# Patient Record
Sex: FEMALE | Race: BLACK OR AFRICAN AMERICAN | NOT HISPANIC OR LATINO | URBAN - METROPOLITAN AREA
[De-identification: names, ages, dates, MRNs, and addresses within clinical notes are randomized per-mention and may not be internally consistent; named-entity substitution may affect disease eponyms.]

---

## 2017-02-06 ENCOUNTER — HISTORICAL (OUTPATIENT)
Dept: BARIATRICS | Facility: HOSPITAL | Age: 52
End: 2017-02-06

## 2017-03-14 ENCOUNTER — HISTORICAL (OUTPATIENT)
Dept: BARIATRICS | Facility: HOSPITAL | Age: 52
End: 2017-03-14

## 2017-03-24 ENCOUNTER — HISTORICAL (OUTPATIENT)
Dept: LAB | Facility: HOSPITAL | Age: 52
End: 2017-03-24

## 2017-03-24 ENCOUNTER — HISTORICAL (OUTPATIENT)
Dept: BARIATRICS | Facility: HOSPITAL | Age: 52
End: 2017-03-24

## 2017-04-18 ENCOUNTER — HISTORICAL (OUTPATIENT)
Dept: BARIATRICS | Facility: HOSPITAL | Age: 52
End: 2017-04-18

## 2017-05-25 ENCOUNTER — HISTORICAL (OUTPATIENT)
Dept: ADMINISTRATIVE | Facility: HOSPITAL | Age: 52
End: 2017-05-25

## 2017-05-25 LAB
ALBUMIN SERPL-MCNC: 3.7 GM/DL (ref 3.4–5)
ALBUMIN/GLOB SERPL: 1 {RATIO}
ALP SERPL-CCNC: 45 UNIT/L (ref 38–126)
ALT SERPL-CCNC: 23 UNIT/L (ref 12–78)
AST SERPL-CCNC: 19 UNIT/L (ref 15–37)
BILIRUB SERPL-MCNC: 0.4 MG/DL (ref 0.2–1)
BILIRUBIN DIRECT+TOT PNL SERPL-MCNC: 0.1 MG/DL (ref 0–0.2)
BILIRUBIN DIRECT+TOT PNL SERPL-MCNC: 0.3 MG/DL (ref 0–0.8)
BUN SERPL-MCNC: 13 MG/DL (ref 7–18)
CALCIUM SERPL-MCNC: 9.5 MG/DL (ref 8.5–10.1)
CHLORIDE SERPL-SCNC: 104 MMOL/L (ref 98–107)
CO2 SERPL-SCNC: 25 MMOL/L (ref 21–32)
CREAT SERPL-MCNC: 0.64 MG/DL (ref 0.55–1.02)
ERYTHROCYTE [DISTWIDTH] IN BLOOD BY AUTOMATED COUNT: 17.2 % (ref 11.5–17)
GLOBULIN SER-MCNC: 3.6 GM/DL (ref 2.4–3.5)
GLUCOSE SERPL-MCNC: 78 MG/DL (ref 74–106)
HCT VFR BLD AUTO: 35.8 % (ref 37–47)
HGB BLD-MCNC: 10.7 GM/DL (ref 12–16)
IRON SATN MFR SERPL: 15.4 % (ref 20–50)
IRON SERPL-MCNC: 45 MCG/DL (ref 50–175)
MCH RBC QN AUTO: 25.4 PG (ref 27–31)
MCHC RBC AUTO-ENTMCNC: 29.9 GM/DL (ref 33–36)
MCV RBC AUTO: 85 FL (ref 80–94)
PLATELET # BLD AUTO: 342 X10(3)/MCL (ref 130–400)
PMV BLD AUTO: 11.3 FL (ref 9.4–12.4)
POTASSIUM SERPL-SCNC: 4.3 MMOL/L (ref 3.5–5.1)
PROT SERPL-MCNC: 7.3 GM/DL (ref 6.4–8.2)
RBC # BLD AUTO: 4.21 X10(6)/MCL (ref 4.2–5.4)
SODIUM SERPL-SCNC: 141 MMOL/L (ref 136–145)
TIBC SERPL-MCNC: 292 MCG/DL (ref 250–450)
TRANSFERRIN SERPL-MCNC: 259 MG/DL (ref 200–360)
VIT B12 SERPL-MCNC: 1007 PG/ML (ref 193–986)
WBC # SPEC AUTO: 6 X10(3)/MCL (ref 4.5–11.5)

## 2017-06-26 ENCOUNTER — HISTORICAL (OUTPATIENT)
Dept: BARIATRICS | Facility: HOSPITAL | Age: 52
End: 2017-06-26

## 2017-09-27 ENCOUNTER — HISTORICAL (OUTPATIENT)
Dept: ADMINISTRATIVE | Facility: HOSPITAL | Age: 52
End: 2017-09-27

## 2017-09-27 LAB
ALBUMIN SERPL-MCNC: 3.7 GM/DL (ref 3.4–5)
ALBUMIN/GLOB SERPL: 1 {RATIO}
ALP SERPL-CCNC: 50 UNIT/L (ref 38–126)
ALT SERPL-CCNC: 16 UNIT/L (ref 12–78)
AST SERPL-CCNC: 11 UNIT/L (ref 15–37)
BILIRUB SERPL-MCNC: 0.3 MG/DL (ref 0.2–1)
BILIRUBIN DIRECT+TOT PNL SERPL-MCNC: 0.1 MG/DL (ref 0–0.2)
BILIRUBIN DIRECT+TOT PNL SERPL-MCNC: 0.2 MG/DL (ref 0–0.8)
BUN SERPL-MCNC: 11 MG/DL (ref 7–18)
CALCIUM SERPL-MCNC: 9.4 MG/DL (ref 8.5–10.1)
CHLORIDE SERPL-SCNC: 105 MMOL/L (ref 98–107)
CO2 SERPL-SCNC: 27 MMOL/L (ref 21–32)
CREAT SERPL-MCNC: 0.55 MG/DL (ref 0.55–1.02)
ERYTHROCYTE [DISTWIDTH] IN BLOOD BY AUTOMATED COUNT: 14.8 % (ref 11.5–17)
GLOBULIN SER-MCNC: 3.6 GM/DL (ref 2.4–3.5)
GLUCOSE SERPL-MCNC: 83 MG/DL (ref 74–106)
HCT VFR BLD AUTO: 35.7 % (ref 37–47)
HGB BLD-MCNC: 11.1 GM/DL (ref 12–16)
IRON SATN MFR SERPL: 17.4 % (ref 20–50)
IRON SERPL-MCNC: 52 MCG/DL (ref 50–175)
MCH RBC QN AUTO: 26.7 PG (ref 27–31)
MCHC RBC AUTO-ENTMCNC: 31.1 GM/DL (ref 33–36)
MCV RBC AUTO: 86 FL (ref 80–94)
PLATELET # BLD AUTO: 294 X10(3)/MCL (ref 130–400)
PMV BLD AUTO: 10.6 FL (ref 9.4–12.4)
POTASSIUM SERPL-SCNC: 4.3 MMOL/L (ref 3.5–5.1)
PROT SERPL-MCNC: 7.3 GM/DL (ref 6.4–8.2)
RBC # BLD AUTO: 4.15 X10(6)/MCL (ref 4.2–5.4)
SODIUM SERPL-SCNC: 139 MMOL/L (ref 136–145)
TIBC SERPL-MCNC: 299 MCG/DL (ref 250–450)
TRANSFERRIN SERPL-MCNC: 244 MG/DL (ref 200–360)
VIT B12 SERPL-MCNC: 755 PG/ML (ref 193–986)
WBC # SPEC AUTO: 5.8 X10(3)/MCL (ref 4.5–11.5)

## 2017-10-06 ENCOUNTER — HISTORICAL (OUTPATIENT)
Dept: BARIATRICS | Facility: HOSPITAL | Age: 52
End: 2017-10-06

## 2017-10-11 ENCOUNTER — HISTORICAL (OUTPATIENT)
Dept: RADIOLOGY | Facility: HOSPITAL | Age: 52
End: 2017-10-11

## 2017-10-12 ENCOUNTER — HISTORICAL (OUTPATIENT)
Dept: RADIOLOGY | Facility: HOSPITAL | Age: 52
End: 2017-10-12

## 2017-10-19 ENCOUNTER — HISTORICAL (OUTPATIENT)
Dept: RADIOLOGY | Facility: HOSPITAL | Age: 52
End: 2017-10-19

## 2022-04-09 ENCOUNTER — HISTORICAL (OUTPATIENT)
Dept: ADMINISTRATIVE | Facility: HOSPITAL | Age: 57
End: 2022-04-09

## 2022-04-27 VITALS
SYSTOLIC BLOOD PRESSURE: 132 MMHG | DIASTOLIC BLOOD PRESSURE: 90 MMHG | WEIGHT: 180 LBS | HEIGHT: 65 IN | BODY MASS INDEX: 29.99 KG/M2

## 2024-03-07 ENCOUNTER — OFFICE VISIT (OUTPATIENT)
Dept: ORTHOPEDICS | Facility: CLINIC | Age: 59
End: 2024-03-07
Payer: OTHER GOVERNMENT

## 2024-03-07 ENCOUNTER — HOSPITAL ENCOUNTER (OUTPATIENT)
Dept: RADIOLOGY | Facility: CLINIC | Age: 59
Discharge: HOME | End: 2024-03-07
Attending: ORTHOPAEDIC SURGERY
Payer: OTHER GOVERNMENT

## 2024-03-07 VITALS
BODY MASS INDEX: 29.16 KG/M2 | WEIGHT: 175 LBS | HEART RATE: 82 BPM | DIASTOLIC BLOOD PRESSURE: 86 MMHG | HEIGHT: 65 IN | SYSTOLIC BLOOD PRESSURE: 139 MMHG

## 2024-03-07 DIAGNOSIS — M25.511 RIGHT SHOULDER PAIN, UNSPECIFIED CHRONICITY: ICD-10-CM

## 2024-03-07 DIAGNOSIS — M75.121 COMPLETE TEAR OF RIGHT ROTATOR CUFF, UNSPECIFIED WHETHER TRAUMATIC: ICD-10-CM

## 2024-03-07 DIAGNOSIS — M75.41 SHOULDER IMPINGEMENT SYNDROME, RIGHT: ICD-10-CM

## 2024-03-07 DIAGNOSIS — M25.511 RIGHT SHOULDER PAIN, UNSPECIFIED CHRONICITY: Primary | ICD-10-CM

## 2024-03-07 PROCEDURE — 99203 OFFICE O/P NEW LOW 30 MIN: CPT | Mod: ,,, | Performed by: ORTHOPAEDIC SURGERY

## 2024-03-07 PROCEDURE — 73030 X-RAY EXAM OF SHOULDER: CPT | Mod: RT,,, | Performed by: ORTHOPAEDIC SURGERY

## 2024-03-07 NOTE — PROGRESS NOTES
"Subjective:    CC: Pain of the Right Shoulder (R shoulder pain - pt states that she went to the Er in August where she was given a shot and medication that didn't work. Pcp gave her a medrol dose pack. Taking OTC and topicals with no relief. )       HPI:  Patient comes in today complaining of worsening right shoulder pain.  Patient states back in August a proximally 7 months ago she has had severe pain in her right shoulder.  She was seen in the ER, had follow up there PCP.  She has had multiple rounds of steroids, medications without relief.  She continues to have pain and loss of motion.    ROS: Refer to HPI for pertinent ROS. All other 12 point systems negative.    Objective:  Vitals:    03/07/24 1510   BP: 139/86   Pulse: 82   Weight: 79.4 kg (175 lb)   Height: 5' 5" (1.651 m)        Physical Exam:  Patient is well-nourished and well-developed, in no apparent distress, pleasant and cooperative. Examination of the right upper extremity compartments are soft and warm.  Skin is intact. There are no signs or symptoms of DVT or infection.   Patient is tender to palpation along the  lateral aspect .  Patient is able to forward flex and abduct to 45°.  Positive Sullivan and Neers, positive empty can, positive drop arm test. Negative sulcus sign. Stable to stressing. Neurovascularly intact distally.    Images:  X-rays three views right shoulder demonstrates previous distal clavicle excision, no obvious fracture dislocation. Images Reviewed and discussed with patient.    Assessment:  1. Right shoulder pain, unspecified chronicity  - X-ray Shoulder 2 or More Views Right; Future    2. Complete tear of right rotator cuff, unspecified whether traumatic  - MRI Shoulder Without Contrast Right    3. Shoulder impingement syndrome, right  - MRI Shoulder Without Contrast Right        Plan:  At this time we discussed her physical exam and x-ray findings.  Patient has had previous surgery in her right shoulder.  She has lost motion " over the last 6 months, has not improved with conservative treatment, we will proceed with an MRI of her right shoulder.  I would like see back next week with her results.    Follow UP: No follow-ups on file.

## 2024-03-11 DIAGNOSIS — M75.121 COMPLETE TEAR OF RIGHT ROTATOR CUFF, UNSPECIFIED WHETHER TRAUMATIC: Primary | ICD-10-CM

## 2024-03-11 RX ORDER — DIAZEPAM 5 MG/1
TABLET ORAL
Qty: 2 TABLET | Refills: 0 | Status: ON HOLD | OUTPATIENT
Start: 2024-03-11 | End: 2024-05-10 | Stop reason: HOSPADM

## 2024-03-14 ENCOUNTER — OFFICE VISIT (OUTPATIENT)
Dept: ORTHOPEDICS | Facility: CLINIC | Age: 59
End: 2024-03-14
Payer: OTHER GOVERNMENT

## 2024-03-14 VITALS
HEART RATE: 86 BPM | HEIGHT: 65 IN | BODY MASS INDEX: 29.17 KG/M2 | SYSTOLIC BLOOD PRESSURE: 126 MMHG | DIASTOLIC BLOOD PRESSURE: 86 MMHG | WEIGHT: 175.06 LBS

## 2024-03-14 DIAGNOSIS — M75.41 SHOULDER IMPINGEMENT SYNDROME, RIGHT: Primary | ICD-10-CM

## 2024-03-14 DIAGNOSIS — M12.811 ROTATOR CUFF ARTHROPATHY, RIGHT: ICD-10-CM

## 2024-03-14 DIAGNOSIS — M75.51 BURSITIS OF RIGHT SHOULDER: ICD-10-CM

## 2024-03-14 DIAGNOSIS — M19.011 OSTEOARTHRITIS OF RIGHT SHOULDER, UNSPECIFIED OSTEOARTHRITIS TYPE: ICD-10-CM

## 2024-03-14 PROCEDURE — 20610 DRAIN/INJ JOINT/BURSA W/O US: CPT | Mod: RT,,,

## 2024-03-14 PROCEDURE — 99214 OFFICE O/P EST MOD 30 MIN: CPT | Mod: 25,,,

## 2024-03-14 RX ORDER — METHYLPREDNISOLONE ACETATE 80 MG/ML
80 INJECTION, SUSPENSION INTRA-ARTICULAR; INTRALESIONAL; INTRAMUSCULAR; SOFT TISSUE
Status: DISCONTINUED | OUTPATIENT
Start: 2024-03-14 | End: 2024-03-14 | Stop reason: HOSPADM

## 2024-03-14 RX ORDER — LIDOCAINE HYDROCHLORIDE 20 MG/ML
3 INJECTION, SOLUTION INFILTRATION; PERINEURAL
Status: DISCONTINUED | OUTPATIENT
Start: 2024-03-14 | End: 2024-03-14 | Stop reason: HOSPADM

## 2024-03-14 RX ORDER — MELOXICAM 15 MG/1
15 TABLET ORAL DAILY PRN
Qty: 30 TABLET | Refills: 0 | Status: ON HOLD | OUTPATIENT
Start: 2024-03-14 | End: 2024-05-10 | Stop reason: HOSPADM

## 2024-03-14 RX ADMIN — METHYLPREDNISOLONE ACETATE 80 MG: 80 INJECTION, SUSPENSION INTRA-ARTICULAR; INTRALESIONAL; INTRAMUSCULAR; SOFT TISSUE at 02:03

## 2024-03-14 RX ADMIN — LIDOCAINE HYDROCHLORIDE 3 MG: 20 INJECTION, SOLUTION INFILTRATION; PERINEURAL at 02:03

## 2024-03-14 NOTE — PROGRESS NOTES
Subjective:    CC: Results of the Right Shoulder (R shoulder MRI results. )       HPI:  Patient presents to clinic for right shoulder MRI results.  She does have some underlying rotator cuff tendinosis along with small interstitial tearing, bursitis, degenerative labral tearing and arthritis.  She continues to have pain and difficulty with range of motion since August.  She has received an IM in oral steroid in the past that have not worked well.  She denies any subacromial steroid injections.  Not currently taking any pain medication.  She has difficulty with overhead reaching.  She has not undergone formal physical therapy.  No new complaints.    ROS: Refer to [facility] for pertinent ROS. All other 12 point systems negative.    Objective:    Vitals:    03/14/24 1419   BP: 126/86   Pulse: 86        Physical Exam:  Patient is well-nourished and well-developed, in no apparent distress, pleasant and cooperative. Examination of the right upper extremity compartments are soft and warm.  Skin is intact. There are no signs or symptoms of DVT or infection.   Patient is tender to palpation along the posterior shoulder along with the anterolateral shoulder with radiation into the deltoid.  Patient is able to forward flex and abduct to 90° actively; 120° passively.  While Sullivan and Neers, positive empty can, negative drop arm test. Negative sulcus sign. Stable to stressing. Neurovascularly intact distally.    Images:  MRI Images Reviewed and discussed with patient.    Assessment:  1. Shoulder impingement syndrome, right  - meloxicam (MOBIC) 15 MG tablet; Take 1 tablet (15 mg total) by mouth daily as needed.  Dispense: 30 tablet; Refill: 0  - Ambulatory referral/consult to Physical/Occupational Therapy; Future    2. Rotator cuff arthropathy, right  - meloxicam (MOBIC) 15 MG tablet; Take 1 tablet (15 mg total) by mouth daily as needed.  Dispense: 30 tablet; Refill: 0  - Ambulatory referral/consult to Physical/Occupational Therapy;  Future    3. Bursitis of right shoulder  - meloxicam (MOBIC) 15 MG tablet; Take 1 tablet (15 mg total) by mouth daily as needed.  Dispense: 30 tablet; Refill: 0  - Ambulatory referral/consult to Physical/Occupational Therapy; Future  - Large Joint Aspiration/Injection: R subacromial bursa  - LIDOcaine HCL 20 mg/ml (2%) injection 3 mg  - methylPREDNISolone acetate injection 80 mg    4. Osteoarthritis of right shoulder, unspecified osteoarthritis type       Plan:  Physical exam and MRI findings discussed with patient.  We will start with conservative management.  She tolerated a right subacromial steroid injection well today in clinic.  Patient was also given a formal PT prescription to work on gaining range of motion and strength.  Instructed to hold off on heavy lifting.  Prescription for Mobic given with appropriate precautions.  I would like to see the patient back in 6 weeks to assess her progress.    Follow up: Follow up in about 6 weeks (around 4/25/2024).    Large Joint Aspiration/Injection: R subacromial bursa    Date/Time: 3/14/2024 2:15 PM    Performed by: Susu Rivas PA-C  Authorized by: Susu Rivas PA-C    Consent Done?:  Yes (Verbal)  Indications:  Pain  Site marked: the procedure site was marked    Timeout: prior to procedure the correct patient, procedure, and site was verified    Prep: patient was prepped and draped in usual sterile fashion    Approach:  Posterior  Location:  Shoulder  Site:  R subacromial bursa  Medications:  3 mg LIDOcaine HCL 20 mg/ml (2%) 20 mg/mL (2 %); 80 mg methylPREDNISolone acetate 80 mg/mL  Patient tolerance:  Patient tolerated the procedure well with no immediate complications

## 2024-04-22 ENCOUNTER — TELEPHONE (OUTPATIENT)
Dept: ORTHOPEDICS | Facility: CLINIC | Age: 59
End: 2024-04-22
Payer: OTHER GOVERNMENT

## 2024-05-02 ENCOUNTER — ANESTHESIA EVENT (OUTPATIENT)
Dept: SURGERY | Facility: HOSPITAL | Age: 59
End: 2024-05-02
Payer: OTHER GOVERNMENT

## 2024-05-02 ENCOUNTER — OFFICE VISIT (OUTPATIENT)
Dept: ORTHOPEDICS | Facility: CLINIC | Age: 59
End: 2024-05-02
Payer: OTHER GOVERNMENT

## 2024-05-02 ENCOUNTER — CLINICAL SUPPORT (OUTPATIENT)
Dept: LAB | Facility: HOSPITAL | Age: 59
End: 2024-05-02
Attending: ORTHOPAEDIC SURGERY
Payer: OTHER GOVERNMENT

## 2024-05-02 DIAGNOSIS — Z01.818 PREOP TESTING: ICD-10-CM

## 2024-05-02 DIAGNOSIS — M75.41 SHOULDER IMPINGEMENT SYNDROME, RIGHT: ICD-10-CM

## 2024-05-02 DIAGNOSIS — M75.121 COMPLETE TEAR OF RIGHT ROTATOR CUFF, UNSPECIFIED WHETHER TRAUMATIC: ICD-10-CM

## 2024-05-02 DIAGNOSIS — M75.41 SHOULDER IMPINGEMENT SYNDROME, RIGHT: Primary | ICD-10-CM

## 2024-05-02 PROCEDURE — 93005 ELECTROCARDIOGRAM TRACING: CPT

## 2024-05-02 PROCEDURE — 99214 OFFICE O/P EST MOD 30 MIN: CPT | Mod: ,,, | Performed by: ORTHOPAEDIC SURGERY

## 2024-05-02 PROCEDURE — 93010 ELECTROCARDIOGRAM REPORT: CPT | Mod: ,,, | Performed by: INTERNAL MEDICINE

## 2024-05-02 RX ORDER — SODIUM CHLORIDE, SODIUM GLUCONATE, SODIUM ACETATE, POTASSIUM CHLORIDE AND MAGNESIUM CHLORIDE 30; 37; 368; 526; 502 MG/100ML; MG/100ML; MG/100ML; MG/100ML; MG/100ML
INJECTION, SOLUTION INTRAVENOUS CONTINUOUS
Status: CANCELLED | OUTPATIENT
Start: 2024-05-02

## 2024-05-03 NOTE — PROGRESS NOTES
Subjective:    CC: Follow-up of the Right Shoulder (F/U for right shoulder injection. Pt states injection didn't ever kick in and states that PT has been helping and shoulder is getting much better. Able to do a lot more. No new concerns with it. )       HPI:  Patient returns today for repeat exam.  Patient continues to have pain, loss of motion of her right shoulder.  She is failed multiple conservative treatments, continues to have pain with daily activities.    ROS: Refer to HPI for pertinent ROS. All other 12 point systems negative.    Objective:  There were no vitals filed for this visit.     Physical Exam:  Patient is well-nourished and well-developed, in no apparent distress, pleasant and cooperative. Examination of the right upper extremity compartments are soft and warm.  Skin is intact. There are no signs or symptoms of DVT or infection.   Patient is tender to palpation along the  anterior and anterolateral aspect .  Patient is able to forward flex and abduct to 90°.  Positive Sullivan and Neers, positive empty can, positive drop arm test. Negative sulcus sign. Stable to stressing. Neurovascularly intact distally.    Images:  Previous MRI was reviewed. Images Reviewed and discussed with patient.    Assessment:  1. Shoulder impingement syndrome, right  - Place in Outpatient; Standing  - Vital signs; Standing  - Insert peripheral IV; Standing  - Clip and Prep Other (please specifiy) (Operative site); Standing  - Cleanse with Chlorhexidine (CHG); Standing  - Diet NPO; Standing  - electrolyte-A infusion  - IP VTE LOW RISK PATIENT; Standing  - Place JEFFREY hose; Standing  - Place sequential compression device; Standing  - ceFAZolin (ANCEF) 2 g in dextrose 5 % (D5W) 50 mL IVPB  - CBC auto differential; Future  - Comprehensive metabolic panel; Future  - EKG 12-lead; Future  - Inpatient consult to Anesthesiology; Standing  - Case Request Operating Room: DEBRIDEMENT, SHOULDER, ARTHROSCOPIC, REPAIR, ROTATOR CUFF,  ARTHROSCOPIC  - Full code; Standing    2. Complete tear of right rotator cuff, unspecified whether traumatic  - Place in Outpatient; Standing  - Vital signs; Standing  - Insert peripheral IV; Standing  - Clip and Prep Other (please specifiy) (Operative site); Standing  - Cleanse with Chlorhexidine (CHG); Standing  - Diet NPO; Standing  - electrolyte-A infusion  - IP VTE LOW RISK PATIENT; Standing  - Place JEFFREY hose; Standing  - Place sequential compression device; Standing  - ceFAZolin (ANCEF) 2 g in dextrose 5 % (D5W) 50 mL IVPB  - CBC auto differential; Future  - Comprehensive metabolic panel; Future  - EKG 12-lead; Future  - Inpatient consult to Anesthesiology; Standing  - Case Request Operating Room: DEBRIDEMENT, SHOULDER, ARTHROSCOPIC, REPAIR, ROTATOR CUFF, ARTHROSCOPIC  - Full code; Standing    3. Preop testing  - Place in Outpatient; Standing  - Vital signs; Standing  - Insert peripheral IV; Standing  - Clip and Prep Other (please specifiy) (Operative site); Standing  - Cleanse with Chlorhexidine (CHG); Standing  - Diet NPO; Standing  - electrolyte-A infusion  - IP VTE LOW RISK PATIENT; Standing  - Place JEFFREY hose; Standing  - Place sequential compression device; Standing  - ceFAZolin (ANCEF) 2 g in dextrose 5 % (D5W) 50 mL IVPB  - CBC auto differential; Future  - Comprehensive metabolic panel; Future  - EKG 12-lead; Future  - Inpatient consult to Anesthesiology; Standing  - Case Request Operating Room: DEBRIDEMENT, SHOULDER, ARTHROSCOPIC, REPAIR, ROTATOR CUFF, ARTHROSCOPIC  - Full code; Standing        Plan:  At this time we discussed her physical exam and previous imaging findings.  The risks benefits and alternatives discussed with the patient in detail.  We have discussed the down time rehab process afterwards.  All questions were answered.  She would like to proceed with surgical intervention, we will set this up at her convenience.    Follow UP: No follow-ups on file.

## 2024-05-07 LAB
OHS QRS DURATION: 106 MS
OHS QTC CALCULATION: 438 MS

## 2024-05-09 NOTE — H&P
Admission History & Physical    Subjective:    CC: Follow-up of the Right Shoulder (F/U for right shoulder injection. Pt states injection didn't ever kick in and states that PT has been helping and shoulder is getting much better. Able to do a lot more. No new concerns with it. )       HPI:  Catalina Estevez presents today for preoperative evaluation for right shoulder arthroscopy, debridement, mini open rotator cuff repair with subacromial decompression. I reviewed the indications for surgery. The risks and benefits of the proposed and alternative treatments were discussed with the patient. Questions pertinent to the procedure were solicited and answered. Dr. Belle was available to answer any questions with instruction to call clinic with any further questions.No assurances were given. Informed consent was obtained. The patient expressed good understanding and wished to proceed with scheduling the procedure.     ROS:   Constitutional: No fever, weakness, or fatigue.   Ear/Nose/Mouth/Throat: No nasal congestion or sore throat.   Respiratory: No shortness of breath or cough.   Cardiovascular: No chest pain, palpitations, or peripheral edema.   Gastrointestinal: No nausea, vomiting, or abdominal pain.   Genitourinary: No dysuria.  Musculoskeletal:  Right shoulder pain, swelling, loss of motion.    Past Surgical History:   Procedure Laterality Date    BREAST BIOPSY Bilateral     CARPAL TUNNEL RELEASE Left      SECTION      x2    HYSTERECTOMY      SURGICAL REMOVAL OF BONE SPUR Right     shoulder        History reviewed. No pertinent past medical history.     Objective:    There were no vitals filed for this visit.     Physical Exam:    Appearance: No distress, good color on room air. Alert and cooperative.  HEENT: Normocephalic. PERRLA EOM intact.   Lungs: Breathing unlabored.  Heart: Regular rate and rhythm.  Abdomen: Soft, non-tender.  No rebound tenderness.  Extremities: Examination of the right upper  extremity compartments are soft and warm. Skin is intact. There are no signs or symptoms of DVT or infection. Patient is tender to palpation along the anterior and anterolateral aspect . Patient is able to forward flex and abduct to 90°. Positive Sullivan and Neers, positive empty can, positive drop arm test. Negative sulcus sign. Stable to stressing. Neurovascularly intact distally.   Skin: No rashes or open wounds.        Assessment:  1. Shoulder impingement syndrome, right  - Place in Outpatient; Standing  - Vital signs; Standing  - Insert peripheral IV; Standing  - Clip and Prep Other (please specifiy) (Operative site); Standing  - Cleanse with Chlorhexidine (CHG); Standing  - Diet NPO; Standing  - electrolyte-A infusion  - IP VTE LOW RISK PATIENT; Standing  - Place JEFFREY hose; Standing  - Place sequential compression device; Standing  - ceFAZolin (ANCEF) 2 g in dextrose 5 % (D5W) 50 mL IVPB  - CBC auto differential; Future  - Comprehensive metabolic panel; Future  - EKG 12-lead; Future  - Inpatient consult to Anesthesiology; Standing  - Case Request Operating Room: DEBRIDEMENT, SHOULDER, ARTHROSCOPIC, REPAIR, ROTATOR CUFF, ARTHROSCOPIC  - Full code; Standing    2. Complete tear of right rotator cuff, unspecified whether traumatic  - Place in Outpatient; Standing  - Vital signs; Standing  - Insert peripheral IV; Standing  - Clip and Prep Other (please specifiy) (Operative site); Standing  - Cleanse with Chlorhexidine (CHG); Standing  - Diet NPO; Standing  - electrolyte-A infusion  - IP VTE LOW RISK PATIENT; Standing  - Place JEFFREY hose; Standing  - Place sequential compression device; Standing  - ceFAZolin (ANCEF) 2 g in dextrose 5 % (D5W) 50 mL IVPB  - CBC auto differential; Future  - Comprehensive metabolic panel; Future  - EKG 12-lead; Future  - Inpatient consult to Anesthesiology; Standing  - Case Request Operating Room: DEBRIDEMENT, SHOULDER, ARTHROSCOPIC, REPAIR, ROTATOR CUFF, ARTHROSCOPIC  - Full code;  Standing    3. Preop testing  - Place in Outpatient; Standing  - Vital signs; Standing  - Insert peripheral IV; Standing  - Clip and Prep Other (please specifiy) (Operative site); Standing  - Cleanse with Chlorhexidine (CHG); Standing  - Diet NPO; Standing  - electrolyte-A infusion  - IP VTE LOW RISK PATIENT; Standing  - Place JEFFREY hose; Standing  - Place sequential compression device; Standing  - ceFAZolin (ANCEF) 2 g in dextrose 5 % (D5W) 50 mL IVPB  - CBC auto differential; Future  - Comprehensive metabolic panel; Future  - EKG 12-lead; Future  - Inpatient consult to Anesthesiology; Standing  - Case Request Operating Room: DEBRIDEMENT, SHOULDER, ARTHROSCOPIC, REPAIR, ROTATOR CUFF, ARTHROSCOPIC  - Full code; Standing       Plan:  Plan for right shoulder arthroscopy, debridement, mini open rotator cuff repair with subacromial decompression at [facility]. The patient has been given preoperative instructions. Post-operative appointment is scheduled for 2 weeks.

## 2024-05-09 NOTE — H&P (VIEW-ONLY)
Admission History & Physical    Subjective:    CC: Follow-up of the Right Shoulder (F/U for right shoulder injection. Pt states injection didn't ever kick in and states that PT has been helping and shoulder is getting much better. Able to do a lot more. No new concerns with it. )       HPI:  Catalina Estevez presents today for preoperative evaluation for right shoulder arthroscopy, debridement, mini open rotator cuff repair with subacromial decompression. I reviewed the indications for surgery. The risks and benefits of the proposed and alternative treatments were discussed with the patient. Questions pertinent to the procedure were solicited and answered. Dr. Belle was available to answer any questions with instruction to call clinic with any further questions.No assurances were given. Informed consent was obtained. The patient expressed good understanding and wished to proceed with scheduling the procedure.     ROS:   Constitutional: No fever, weakness, or fatigue.   Ear/Nose/Mouth/Throat: No nasal congestion or sore throat.   Respiratory: No shortness of breath or cough.   Cardiovascular: No chest pain, palpitations, or peripheral edema.   Gastrointestinal: No nausea, vomiting, or abdominal pain.   Genitourinary: No dysuria.  Musculoskeletal:  Right shoulder pain, swelling, loss of motion.    Past Surgical History:   Procedure Laterality Date    BREAST BIOPSY Bilateral     CARPAL TUNNEL RELEASE Left      SECTION      x2    HYSTERECTOMY      SURGICAL REMOVAL OF BONE SPUR Right     shoulder        History reviewed. No pertinent past medical history.     Objective:    There were no vitals filed for this visit.     Physical Exam:    Appearance: No distress, good color on room air. Alert and cooperative.  HEENT: Normocephalic. PERRLA EOM intact.   Lungs: Breathing unlabored.  Heart: Regular rate and rhythm.  Abdomen: Soft, non-tender.  No rebound tenderness.  Extremities: Examination of the right upper  extremity compartments are soft and warm. Skin is intact. There are no signs or symptoms of DVT or infection. Patient is tender to palpation along the anterior and anterolateral aspect . Patient is able to forward flex and abduct to 90°. Positive Sullivan and Neers, positive empty can, positive drop arm test. Negative sulcus sign. Stable to stressing. Neurovascularly intact distally.   Skin: No rashes or open wounds.        Assessment:  1. Shoulder impingement syndrome, right  - Place in Outpatient; Standing  - Vital signs; Standing  - Insert peripheral IV; Standing  - Clip and Prep Other (please specifiy) (Operative site); Standing  - Cleanse with Chlorhexidine (CHG); Standing  - Diet NPO; Standing  - electrolyte-A infusion  - IP VTE LOW RISK PATIENT; Standing  - Place JEFFREY hose; Standing  - Place sequential compression device; Standing  - ceFAZolin (ANCEF) 2 g in dextrose 5 % (D5W) 50 mL IVPB  - CBC auto differential; Future  - Comprehensive metabolic panel; Future  - EKG 12-lead; Future  - Inpatient consult to Anesthesiology; Standing  - Case Request Operating Room: DEBRIDEMENT, SHOULDER, ARTHROSCOPIC, REPAIR, ROTATOR CUFF, ARTHROSCOPIC  - Full code; Standing    2. Complete tear of right rotator cuff, unspecified whether traumatic  - Place in Outpatient; Standing  - Vital signs; Standing  - Insert peripheral IV; Standing  - Clip and Prep Other (please specifiy) (Operative site); Standing  - Cleanse with Chlorhexidine (CHG); Standing  - Diet NPO; Standing  - electrolyte-A infusion  - IP VTE LOW RISK PATIENT; Standing  - Place JEFFREY hose; Standing  - Place sequential compression device; Standing  - ceFAZolin (ANCEF) 2 g in dextrose 5 % (D5W) 50 mL IVPB  - CBC auto differential; Future  - Comprehensive metabolic panel; Future  - EKG 12-lead; Future  - Inpatient consult to Anesthesiology; Standing  - Case Request Operating Room: DEBRIDEMENT, SHOULDER, ARTHROSCOPIC, REPAIR, ROTATOR CUFF, ARTHROSCOPIC  - Full code;  Standing    3. Preop testing  - Place in Outpatient; Standing  - Vital signs; Standing  - Insert peripheral IV; Standing  - Clip and Prep Other (please specifiy) (Operative site); Standing  - Cleanse with Chlorhexidine (CHG); Standing  - Diet NPO; Standing  - electrolyte-A infusion  - IP VTE LOW RISK PATIENT; Standing  - Place JEFFREY hose; Standing  - Place sequential compression device; Standing  - ceFAZolin (ANCEF) 2 g in dextrose 5 % (D5W) 50 mL IVPB  - CBC auto differential; Future  - Comprehensive metabolic panel; Future  - EKG 12-lead; Future  - Inpatient consult to Anesthesiology; Standing  - Case Request Operating Room: DEBRIDEMENT, SHOULDER, ARTHROSCOPIC, REPAIR, ROTATOR CUFF, ARTHROSCOPIC  - Full code; Standing       Plan:  Plan for right shoulder arthroscopy, debridement, mini open rotator cuff repair with subacromial decompression at [facility]. The patient has been given preoperative instructions. Post-operative appointment is scheduled for 2 weeks.

## 2024-05-10 ENCOUNTER — HOSPITAL ENCOUNTER (OUTPATIENT)
Facility: HOSPITAL | Age: 59
Discharge: HOME | End: 2024-05-10
Attending: ORTHOPAEDIC SURGERY | Admitting: ORTHOPAEDIC SURGERY
Payer: OTHER GOVERNMENT

## 2024-05-10 ENCOUNTER — ANESTHESIA (OUTPATIENT)
Dept: SURGERY | Facility: HOSPITAL | Age: 59
End: 2024-05-10
Payer: OTHER GOVERNMENT

## 2024-05-10 DIAGNOSIS — M75.41 SHOULDER IMPINGEMENT SYNDROME, RIGHT: ICD-10-CM

## 2024-05-10 DIAGNOSIS — M75.121 COMPLETE TEAR OF RIGHT ROTATOR CUFF, UNSPECIFIED WHETHER TRAUMATIC: ICD-10-CM

## 2024-05-10 DIAGNOSIS — Z01.818 PREOP TESTING: ICD-10-CM

## 2024-05-10 PROCEDURE — 63600175 PHARM REV CODE 636 W HCPCS: Mod: JZ,JG | Performed by: STUDENT IN AN ORGANIZED HEALTH CARE EDUCATION/TRAINING PROGRAM

## 2024-05-10 PROCEDURE — 37000008 HC ANESTHESIA 1ST 15 MINUTES: Performed by: ORTHOPAEDIC SURGERY

## 2024-05-10 PROCEDURE — 27201423 OPTIME MED/SURG SUP & DEVICES STERILE SUPPLY: Performed by: ORTHOPAEDIC SURGERY

## 2024-05-10 PROCEDURE — 63600175 PHARM REV CODE 636 W HCPCS

## 2024-05-10 PROCEDURE — C1889 IMPLANT/INSERT DEVICE, NOC: HCPCS | Performed by: ORTHOPAEDIC SURGERY

## 2024-05-10 PROCEDURE — 71000015 HC POSTOP RECOV 1ST HR: Performed by: ORTHOPAEDIC SURGERY

## 2024-05-10 PROCEDURE — C1713 ANCHOR/SCREW BN/BN,TIS/BN: HCPCS | Performed by: ORTHOPAEDIC SURGERY

## 2024-05-10 PROCEDURE — 37000009 HC ANESTHESIA EA ADD 15 MINS: Performed by: ORTHOPAEDIC SURGERY

## 2024-05-10 PROCEDURE — D9220A PRA ANESTHESIA: Mod: CRNA,,, | Performed by: NURSE ANESTHETIST, CERTIFIED REGISTERED

## 2024-05-10 PROCEDURE — 29822 SHO ARTHRS SRG LMTD DBRDMT: CPT | Mod: 51,RT,, | Performed by: ORTHOPAEDIC SURGERY

## 2024-05-10 PROCEDURE — 63600175 PHARM REV CODE 636 W HCPCS: Performed by: STUDENT IN AN ORGANIZED HEALTH CARE EDUCATION/TRAINING PROGRAM

## 2024-05-10 PROCEDURE — 25000003 PHARM REV CODE 250: Performed by: NURSE ANESTHETIST, CERTIFIED REGISTERED

## 2024-05-10 PROCEDURE — 63600175 PHARM REV CODE 636 W HCPCS: Performed by: ORTHOPAEDIC SURGERY

## 2024-05-10 PROCEDURE — 23412 REPAIR ROTATOR CUFF CHRONIC: CPT | Mod: RT,,, | Performed by: ORTHOPAEDIC SURGERY

## 2024-05-10 PROCEDURE — 63600175 PHARM REV CODE 636 W HCPCS: Performed by: NURSE ANESTHETIST, CERTIFIED REGISTERED

## 2024-05-10 PROCEDURE — 25000003 PHARM REV CODE 250

## 2024-05-10 PROCEDURE — 36000711: Performed by: ORTHOPAEDIC SURGERY

## 2024-05-10 PROCEDURE — 64415 NJX AA&/STRD BRCH PLXS IMG: CPT | Performed by: STUDENT IN AN ORGANIZED HEALTH CARE EDUCATION/TRAINING PROGRAM

## 2024-05-10 PROCEDURE — 71000033 HC RECOVERY, INTIAL HOUR: Performed by: ORTHOPAEDIC SURGERY

## 2024-05-10 PROCEDURE — D9220A PRA ANESTHESIA: Mod: ANES,,, | Performed by: STUDENT IN AN ORGANIZED HEALTH CARE EDUCATION/TRAINING PROGRAM

## 2024-05-10 PROCEDURE — 71000016 HC POSTOP RECOV ADDL HR: Performed by: ORTHOPAEDIC SURGERY

## 2024-05-10 PROCEDURE — 36000710: Performed by: ORTHOPAEDIC SURGERY

## 2024-05-10 DEVICE — IMPLANTABLE DEVICE
Type: IMPLANTABLE DEVICE | Site: SHOULDER | Status: FUNCTIONAL
Brand: VENTIX™

## 2024-05-10 RX ORDER — PROCHLORPERAZINE EDISYLATE 5 MG/ML
5 INJECTION INTRAMUSCULAR; INTRAVENOUS EVERY 30 MIN PRN
Status: DISCONTINUED | OUTPATIENT
Start: 2024-05-10 | End: 2024-05-10 | Stop reason: HOSPADM

## 2024-05-10 RX ORDER — MIDAZOLAM HYDROCHLORIDE 2 MG/2ML
INJECTION, SOLUTION INTRAMUSCULAR; INTRAVENOUS
Status: COMPLETED
Start: 2024-05-10 | End: 2024-05-10

## 2024-05-10 RX ORDER — METHOCARBAMOL 500 MG/1
500 TABLET, FILM COATED ORAL EVERY 6 HOURS PRN
Status: DISCONTINUED | OUTPATIENT
Start: 2024-05-10 | End: 2024-05-10 | Stop reason: HOSPADM

## 2024-05-10 RX ORDER — METOCLOPRAMIDE HYDROCHLORIDE 5 MG/ML
10 INJECTION INTRAMUSCULAR; INTRAVENOUS EVERY 6 HOURS PRN
Status: DISCONTINUED | OUTPATIENT
Start: 2024-05-10 | End: 2024-05-10 | Stop reason: HOSPADM

## 2024-05-10 RX ORDER — HYDROMORPHONE HYDROCHLORIDE 2 MG/ML
0.4 INJECTION, SOLUTION INTRAMUSCULAR; INTRAVENOUS; SUBCUTANEOUS EVERY 10 MIN PRN
Status: DISCONTINUED | OUTPATIENT
Start: 2024-05-10 | End: 2024-05-10 | Stop reason: HOSPADM

## 2024-05-10 RX ORDER — HYDROCODONE BITARTRATE AND ACETAMINOPHEN 5; 325 MG/1; MG/1
1 TABLET ORAL EVERY 4 HOURS PRN
Status: DISCONTINUED | OUTPATIENT
Start: 2024-05-10 | End: 2024-05-10 | Stop reason: HOSPADM

## 2024-05-10 RX ORDER — LIDOCAINE HYDROCHLORIDE 10 MG/ML
INJECTION, SOLUTION EPIDURAL; INFILTRATION; INTRACAUDAL; PERINEURAL
Status: DISCONTINUED | OUTPATIENT
Start: 2024-05-10 | End: 2024-05-10

## 2024-05-10 RX ORDER — MIDAZOLAM HYDROCHLORIDE 1 MG/ML
2 INJECTION INTRAMUSCULAR; INTRAVENOUS ONCE
Status: DISCONTINUED | OUTPATIENT
Start: 2024-05-10 | End: 2024-05-10 | Stop reason: HOSPADM

## 2024-05-10 RX ORDER — MORPHINE SULFATE 4 MG/ML
4 INJECTION, SOLUTION INTRAMUSCULAR; INTRAVENOUS
Status: DISCONTINUED | OUTPATIENT
Start: 2024-05-10 | End: 2024-05-10 | Stop reason: HOSPADM

## 2024-05-10 RX ORDER — DIPHENHYDRAMINE HYDROCHLORIDE 50 MG/ML
25 INJECTION INTRAMUSCULAR; INTRAVENOUS EVERY 6 HOURS PRN
Status: DISCONTINUED | OUTPATIENT
Start: 2024-05-10 | End: 2024-05-10 | Stop reason: HOSPADM

## 2024-05-10 RX ORDER — PHENYLEPHRINE HYDROCHLORIDE 10 MG/ML
INJECTION INTRAVENOUS
Status: DISCONTINUED | OUTPATIENT
Start: 2024-05-10 | End: 2024-05-10

## 2024-05-10 RX ORDER — ACETAMINOPHEN 10 MG/ML
1000 INJECTION, SOLUTION INTRAVENOUS ONCE
Status: DISCONTINUED | OUTPATIENT
Start: 2024-05-10 | End: 2024-05-10

## 2024-05-10 RX ORDER — EPHEDRINE SULFATE 50 MG/ML
INJECTION, SOLUTION INTRAVENOUS
Status: DISCONTINUED | OUTPATIENT
Start: 2024-05-10 | End: 2024-05-10

## 2024-05-10 RX ORDER — PROPOFOL 10 MG/ML
VIAL (ML) INTRAVENOUS
Status: DISCONTINUED | OUTPATIENT
Start: 2024-05-10 | End: 2024-05-10

## 2024-05-10 RX ORDER — CALCIUM CARBONATE 200(500)MG
500 TABLET,CHEWABLE ORAL 3 TIMES DAILY PRN
Status: DISCONTINUED | OUTPATIENT
Start: 2024-05-10 | End: 2024-05-10 | Stop reason: HOSPADM

## 2024-05-10 RX ORDER — ROPIVACAINE HYDROCHLORIDE 5 MG/ML
INJECTION, SOLUTION EPIDURAL; INFILTRATION; PERINEURAL
Status: DISCONTINUED | OUTPATIENT
Start: 2024-05-10 | End: 2024-05-10

## 2024-05-10 RX ORDER — METOCLOPRAMIDE HYDROCHLORIDE 5 MG/ML
10 INJECTION INTRAMUSCULAR; INTRAVENOUS EVERY 10 MIN PRN
Status: DISCONTINUED | OUTPATIENT
Start: 2024-05-10 | End: 2024-05-10 | Stop reason: HOSPADM

## 2024-05-10 RX ORDER — ROCURONIUM BROMIDE 10 MG/ML
INJECTION, SOLUTION INTRAVENOUS
Status: DISCONTINUED | OUTPATIENT
Start: 2024-05-10 | End: 2024-05-10

## 2024-05-10 RX ORDER — ROPIVACAINE HYDROCHLORIDE 5 MG/ML
30 INJECTION, SOLUTION EPIDURAL; INFILTRATION; PERINEURAL ONCE
Status: DISCONTINUED | OUTPATIENT
Start: 2024-05-10 | End: 2024-05-10 | Stop reason: HOSPADM

## 2024-05-10 RX ORDER — FENTANYL CITRATE 50 UG/ML
INJECTION, SOLUTION INTRAMUSCULAR; INTRAVENOUS
Status: DISCONTINUED | OUTPATIENT
Start: 2024-05-10 | End: 2024-05-10

## 2024-05-10 RX ORDER — ONDANSETRON HYDROCHLORIDE 2 MG/ML
4 INJECTION, SOLUTION INTRAVENOUS EVERY 6 HOURS PRN
Status: DISCONTINUED | OUTPATIENT
Start: 2024-05-10 | End: 2024-05-10 | Stop reason: HOSPADM

## 2024-05-10 RX ORDER — ACETAMINOPHEN 10 MG/ML
1000 INJECTION, SOLUTION INTRAVENOUS ONCE
Status: COMPLETED | OUTPATIENT
Start: 2024-05-10 | End: 2024-05-10

## 2024-05-10 RX ORDER — MUPIROCIN 20 MG/G
OINTMENT TOPICAL 2 TIMES DAILY
Status: DISCONTINUED | OUTPATIENT
Start: 2024-05-10 | End: 2024-05-10 | Stop reason: HOSPADM

## 2024-05-10 RX ORDER — DEXAMETHASONE SODIUM PHOSPHATE 4 MG/ML
INJECTION, SOLUTION INTRA-ARTICULAR; INTRALESIONAL; INTRAMUSCULAR; INTRAVENOUS; SOFT TISSUE
Status: DISCONTINUED | OUTPATIENT
Start: 2024-05-10 | End: 2024-05-10

## 2024-05-10 RX ORDER — OXYCODONE AND ACETAMINOPHEN 10; 325 MG/1; MG/1
1 TABLET ORAL EVERY 8 HOURS PRN
Qty: 21 TABLET | Refills: 0 | Status: SHIPPED | OUTPATIENT
Start: 2024-05-10 | End: 2024-05-17

## 2024-05-10 RX ORDER — ONDANSETRON HYDROCHLORIDE 2 MG/ML
INJECTION, SOLUTION INTRAVENOUS
Status: DISCONTINUED | OUTPATIENT
Start: 2024-05-10 | End: 2024-05-10

## 2024-05-10 RX ORDER — MIDAZOLAM HYDROCHLORIDE 1 MG/ML
2 INJECTION INTRAMUSCULAR; INTRAVENOUS ONCE
Status: COMPLETED | OUTPATIENT
Start: 2024-05-10 | End: 2024-05-10

## 2024-05-10 RX ORDER — MEPERIDINE HYDROCHLORIDE 25 MG/ML
12.5 INJECTION INTRAMUSCULAR; INTRAVENOUS; SUBCUTANEOUS EVERY 10 MIN PRN
Status: DISCONTINUED | OUTPATIENT
Start: 2024-05-10 | End: 2024-05-10 | Stop reason: HOSPADM

## 2024-05-10 RX ORDER — ALUMINUM HYDROXIDE, MAGNESIUM HYDROXIDE, AND SIMETHICONE 1200; 120; 1200 MG/30ML; MG/30ML; MG/30ML
30 SUSPENSION ORAL EVERY 6 HOURS PRN
Status: DISCONTINUED | OUTPATIENT
Start: 2024-05-10 | End: 2024-05-10 | Stop reason: HOSPADM

## 2024-05-10 RX ORDER — EPINEPHRINE 1 MG/ML
INJECTION, SOLUTION, CONCENTRATE INTRAVENOUS
Status: DISCONTINUED | OUTPATIENT
Start: 2024-05-10 | End: 2024-05-10 | Stop reason: HOSPADM

## 2024-05-10 RX ORDER — SODIUM CHLORIDE, SODIUM GLUCONATE, SODIUM ACETATE, POTASSIUM CHLORIDE AND MAGNESIUM CHLORIDE 30; 37; 368; 526; 502 MG/100ML; MG/100ML; MG/100ML; MG/100ML; MG/100ML
INJECTION, SOLUTION INTRAVENOUS CONTINUOUS
Status: DISCONTINUED | OUTPATIENT
Start: 2024-05-10 | End: 2024-05-10 | Stop reason: HOSPADM

## 2024-05-10 RX ORDER — SODIUM CHLORIDE 9 MG/ML
INJECTION, SOLUTION INTRAVENOUS CONTINUOUS
Status: DISCONTINUED | OUTPATIENT
Start: 2024-05-10 | End: 2024-05-10 | Stop reason: HOSPADM

## 2024-05-10 RX ADMIN — MIDAZOLAM HYDROCHLORIDE 2 MG: 1 INJECTION, SOLUTION INTRAMUSCULAR; INTRAVENOUS at 09:05

## 2024-05-10 RX ADMIN — ROCURONIUM BROMIDE 50 MG: 10 SOLUTION INTRAVENOUS at 09:05

## 2024-05-10 RX ADMIN — ROPIVACAINE HYDROCHLORIDE 30 ML: 5 INJECTION, SOLUTION EPIDURAL; INFILTRATION; PERINEURAL at 09:05

## 2024-05-10 RX ADMIN — LIDOCAINE HYDROCHLORIDE 4 ML: 10 INJECTION, SOLUTION EPIDURAL; INFILTRATION; INTRACAUDAL; PERINEURAL at 09:05

## 2024-05-10 RX ADMIN — MIDAZOLAM HYDROCHLORIDE 2 MG: 1 INJECTION INTRAMUSCULAR; INTRAVENOUS at 09:05

## 2024-05-10 RX ADMIN — FENTANYL CITRATE 50 MCG: 50 INJECTION, SOLUTION INTRAMUSCULAR; INTRAVENOUS at 09:05

## 2024-05-10 RX ADMIN — EPHEDRINE SULFATE 10 MG: 50 INJECTION INTRAVENOUS at 10:05

## 2024-05-10 RX ADMIN — SODIUM CHLORIDE, SODIUM GLUCONATE, SODIUM ACETATE, POTASSIUM CHLORIDE AND MAGNESIUM CHLORIDE: 526; 502; 368; 37; 30 INJECTION, SOLUTION INTRAVENOUS at 09:05

## 2024-05-10 RX ADMIN — SUGAMMADEX 200 MG: 100 INJECTION, SOLUTION INTRAVENOUS at 10:05

## 2024-05-10 RX ADMIN — PHENYLEPHRINE HYDROCHLORIDE 100 MCG: 10 INJECTION INTRAVENOUS at 09:05

## 2024-05-10 RX ADMIN — PHENYLEPHRINE HYDROCHLORIDE 100 MCG: 10 INJECTION INTRAVENOUS at 10:05

## 2024-05-10 RX ADMIN — DEXAMETHASONE SODIUM PHOSPHATE 8 MG: 4 INJECTION, SOLUTION INTRA-ARTICULAR; INTRALESIONAL; INTRAMUSCULAR; INTRAVENOUS; SOFT TISSUE at 09:05

## 2024-05-10 RX ADMIN — ACETAMINOPHEN 1000 MG: 10 INJECTION INTRAVENOUS at 11:05

## 2024-05-10 RX ADMIN — ONDANSETRON HYDROCHLORIDE 4 MG: 2 SOLUTION INTRAMUSCULAR; INTRAVENOUS at 10:05

## 2024-05-10 RX ADMIN — PROPOFOL 140 MG: 10 INJECTION, EMULSION INTRAVENOUS at 09:05

## 2024-05-10 RX ADMIN — CEFAZOLIN 2 G: 2 INJECTION, POWDER, FOR SOLUTION INTRAMUSCULAR; INTRAVENOUS at 09:05

## 2024-05-10 NOTE — ANESTHESIA PROCEDURE NOTES
Intubation    Date/Time: 5/10/2024 9:40 AM    Performed by: Yair Hicks CRNA  Authorized by: Hernan White MD    Intubation:     Induction:  Intravenous    Mask Ventilation:  Easy mask    Attempts:  1    Attempted By:  CRNA    Method of Intubation:  Direct    Blade:  Rogers 2    Laryngeal View Grade: Grade I - full view of cords      Difficult Airway Encountered?: No      Complications:  None    Airway Device:  Oral endotracheal tube    Airway Device Size:  7.5    Style/Cuff Inflation:  Cuffed    Tube secured:  21    Secured at:  The lips    Placement Verified By:  Capnometry    Complicating Factors:  None    Findings Post-Intubation:  BS equal bilateral

## 2024-05-10 NOTE — PLAN OF CARE
Preparing for discharge. Vss. Inst reviewed. Ice pack to rt shoulder. Abduction sling RUE. Requests rx for 800mg motrin, message sent to dr. Belle

## 2024-05-10 NOTE — TRANSFER OF CARE
"Anesthesia Transfer of Care Note    Patient: Catalina Estevez    Procedure(s) Performed: Procedure(s) (LRB):  DEBRIDEMENT, SHOULDER, ARTHROSCOPIC (Right)  REPAIR, ROTATOR CUFF, ARTHROSCOPIC (Right)    Patient location: PACU    Anesthesia Type: general    Transport from OR: Transported from OR on room air with adequate spontaneous ventilation    Post pain: adequate analgesia    Post assessment: no apparent anesthetic complications    Post vital signs: stable    Level of consciousness: awake and sedated    Nausea/Vomiting: no nausea/vomiting    Complications: none    Transfer of care protocol was followed      Last vitals: Visit Vitals  BP (!) 148/94   Pulse 72   Temp 35.9 °C (96.7 °F) (Tympanic)   Resp 20   Ht 5' 5" (1.651 m)   Wt 79.8 kg (175 lb 14.8 oz)   SpO2 100%   Breastfeeding No   BMI 29.28 kg/m²     "

## 2024-05-10 NOTE — ANESTHESIA POSTPROCEDURE EVALUATION
Anesthesia Post Evaluation    Patient: Catalina Estevez    Procedure(s) Performed: Procedure(s) (LRB):  DEBRIDEMENT, SHOULDER, ARTHROSCOPIC (Right)  REPAIR, ROTATOR CUFF, ARTHROSCOPIC (Right)    Final Anesthesia Type: general      Patient location during evaluation: PACU  Patient participation: Yes- Able to Participate  Level of consciousness: awake and alert  Post-procedure vital signs: reviewed and stable  Pain management: adequate  Airway patency: patent    PONV status at discharge: No PONV  Anesthetic complications: no      Respiratory status: unassisted  Hydration status: euvolemic  Follow-up not needed.              Vitals Value Taken Time   /83 05/10/24 1202   Temp 36 °C (96.8 °F) 05/10/24 1045   Pulse 72 05/10/24 1203   Resp 19 05/10/24 1116   SpO2 94 % 05/10/24 1203   Vitals shown include unfiled device data.      Event Time   Out of Recovery 11:13:25         Pain/Storm Score: Pain Rating Prior to Med Admin: 6 (5/10/2024 11:02 AM)  Storm Score: 9 (5/10/2024 11:05 AM)

## 2024-05-10 NOTE — ANESTHESIA PREPROCEDURE EVALUATION
05/10/2024  Catalina Estevez is a 59 y.o., female.    Na 142, K 4.1, Cr 0.76  10 / 33 / 344k    Pre-op Assessment    I have reviewed the Patient Summary Reports.     I have reviewed the Nursing Notes. I have reviewed the NPO Status.   I have reviewed the Medications.     Review of Systems  Anesthesia Hx:               Denies Personal Hx of Anesthesia complications.                    Cardiovascular:  Exercise tolerance: good                                           Pulmonary:  Pulmonary Normal                       Hepatic/GI:  Hepatic/GI Normal                 Musculoskeletal:  Musculoskeletal Normal                Neurological:  Neurology Normal                                      Endocrine:  Endocrine Normal            Psych:   anxiety                 Physical Exam  General: Alert, Cooperative and Well nourished    Airway:  Mallampati: II   Mouth Opening: Normal  TM Distance: Normal  Tongue: Normal    Dental:  Intact    Chest/Lungs:  Normal Respiratory Rate        Anesthesia Plan  Type of Anesthesia, risks & benefits discussed:    Anesthesia Type: Gen ETT  Intra-op Monitoring Plan: Standard ASA Monitors  Post Op Pain Control Plan: multimodal analgesia and peripheral nerve block  Induction:  IV  Informed Consent: Informed consent signed with the Patient and all parties understand the risks and agree with anesthesia plan.  All questions answered.   ASA Score: 2  Day of Surgery Review of History & Physical: H&P Update referred to the surgeon/provider.  Anesthesia Plan Notes:   Analgesic interscalene nerve block    Ready For Surgery From Anesthesia Perspective.     .

## 2024-05-10 NOTE — BRIEF OP NOTE
[facility] - Periop Services  Brief Operative Note    Surgery Date: 5/10/2024     Surgeons and Role:     * Donta Belle MD - Primary    Assisting Surgeon: None    Pre-op Diagnosis:  Shoulder impingement syndrome, right [M75.41]  Complete tear of right rotator cuff, unspecified whether traumatic [M75.121]  Preop testing [Z01.818]    Post-op Diagnosis:  Post-Op Diagnosis Codes:     * Shoulder impingement syndrome, right [M75.41]     * Complete tear of right rotator cuff, unspecified whether traumatic [M75.121]     * Preop testing [Z01.818]    Procedure(s) (LRB):  DEBRIDEMENT, SHOULDER, ARTHROSCOPIC (Right)  REPAIR, ROTATOR CUFF, ARTHROSCOPIC (Right)    Anesthesia: General    Operative Findings: right shoulder scope, carlos, mo rtc, sad    Estimated Blood Loss: * No values recorded between 5/10/2024 10:07 AM and 5/10/2024 10:31 AM *         Specimens:   Specimen (24h ago, onward)      None              Discharge Note    OUTCOME: Patient tolerated treatment/procedure well without complication and is now ready for discharge.    DISPOSITION: Home or Self Care    FINAL DIAGNOSIS:  Shoulder impingement syndrome, right    FOLLOWUP: In clinic    DISCHARGE INSTRUCTIONS:    Discharge Procedure Orders   SLING ORTHOPEDIC MEDIUM FOR HOME USE     Diet general     Ice to affected area     Lifting restrictions     No driving, operating heavy equipment or signing legal documents while taking pain medication.     Other restrictions (specify):   Order Comments: Ok to come out of sling for pendulum exercises, otherwise continue sling. No heavy lifting.     Remove dressing in 72 hours     Wound care routine (specify)   Order Comments: Wound care routine: keep dressing clean, dry, and intact. Ok to remove in 3 days and shower. No submersion.     Call MD for:  temperature >100.4     Call MD for:  persistent nausea and vomiting     Call MD for:  severe uncontrolled pain     Call MD for:  difficulty breathing, headache or  visual disturbances     Call MD for:  redness, tenderness, or signs of infection (pain, swelling, redness, odor or green/yellow discharge around incision site)     Call MD for:  hives     Call MD for:  persistent dizziness or light-headedness     Call MD for:  extreme fatigue     Shower on day dressing removed (No bath)

## 2024-05-10 NOTE — ANESTHESIA PROCEDURE NOTES
Right single shot interscalene block    Patient location during procedure: pre-op   Block not for primary anesthetic.  Reason for block: at surgeon's request and post-op pain management   Post-op Pain Location: right shoulder   Start time: 5/10/2024 9:00 AM  Timeout: 5/10/2024 9:00 AM   End time: 5/10/2024 9:03 AM    Staffing  Authorizing Provider: Hernan White MD  Performing Provider: Hernan White MD    Staffing  Performed by: Hernan White MD  Authorized by: Hernan White MD    Preanesthetic Checklist  Completed: patient identified, IV checked, site marked, risks and benefits discussed, surgical consent, monitors and equipment checked, pre-op evaluation and timeout performed  Peripheral Block  Patient position: sitting  Prep: ChloraPrep  Patient monitoring: heart rate, cardiac monitor, continuous pulse ox, continuous capnometry and frequent blood pressure checks  Block type: interscalene  Laterality: right  Injection technique: single shot  Needle  Needle type: Stimuplex   Needle gauge: 20 G  Needle length: 4 in  Needle localization: anatomical landmarks and ultrasound guidance   -ultrasound image captured on disc.  Assessment  Injection assessment: negative aspiration, negative parasthesia and local visualized surrounding nerve  Paresthesia pain: none  Heart rate change: no  Slow fractionated injection: yes    Medications:    Medications: ropivacaine (NAROPIN) injection 0.5% - Perineural   30 mL - 5/10/2024 9:03:00 AM    Additional Notes  Good view of C5-C7 between anterior and middle scalene muscles, needle guided adjacent to plexus and local dosed without paresthesias.  Good perineural spread noted, no complications.  VSS.  [facility] RN monitoring vitals throughout procedure.  Patient tolerated procedure well.

## 2024-05-13 VITALS
SYSTOLIC BLOOD PRESSURE: 129 MMHG | RESPIRATION RATE: 19 BRPM | HEIGHT: 65 IN | BODY MASS INDEX: 29.31 KG/M2 | TEMPERATURE: 97 F | WEIGHT: 175.94 LBS | DIASTOLIC BLOOD PRESSURE: 76 MMHG | OXYGEN SATURATION: 95 % | HEART RATE: 78 BPM

## 2024-05-13 NOTE — OP NOTE
DATE OF SURGERY:    5/10/24     SURGEON:  Donta Belle MD      HOSPITAL:  [facility]      SERVICE:  Orthopedics      PREOPERATIVE DIAGNOSIS:  Right shoulder labral tear, full-thickness rotator cuff tear, subacromial impingement      POSTOPERATIVE DIAGNOSIS:  Same as above.      PROCEDURE:  1. Right shoulder arthroscopy with debridement of anterior and superior labral tear 2.  Right shoulder mini open full-thickness rotator cuff repair 3. Right shoulder mini open subacromial decompression      ANESTHESIA:  LMA.      IV FLUIDS:  See Anesthesia.      ESTIMATED BLOOD LOSS:  Less than 20 cc.      COUNTS:  Correct.      COMPLICATIONS:  None.      IMPLANTS:    Implant Name Type Inv. Item Serial No.  Lot No. LRB No. Used Action   ventix link knotless anchor 4.75mm peek anchor     [id number] Right 1 Implanted         DISPOSITION:  Stable to PACU.      INDICATIONS FOR PROCEDURE: Catalina Estevez is a 59 y.o. old female  with continued pain and loss of motion of the right shoulder.  The patient has failed multiple conservative treatments. Risks, benefits, and alternatives discussed with the patient as well as patient's family in detail.  All questions were answered.  Informed consent was obtained.      PROCEDURE IN DETAIL: The patient was found in preoperative holding by Anesthesia and found fit for surgery.  Patient was taken to the operating room and placed on the operating table in supine position.  All bony prominences were well padded.  Time-out was called to identify correct patient, correct procedure, and correct site, and all were in agreement.  The patient underwent LMA anesthesia without complications.  The patient was then prepped and draped in normal sterile fashion, leaving the right upper extremity exposed for surgery.  The patient was in the modified beach chair position.  Preoperative antibiotics wer given. Next, through the posterior portal with good backflow, a 1 cm incision was made.  A camera was  introduced into the glenohumeral joint.  After this was done, the anterior portal was then made through an incision just lateral to the tip of the coracoid.  Next, examination of the glenohumeral joint demonstrated degenerative changes both on the glenoid and humeral head side she did have tearing of the superior and anterior labrum which was debrided with a 3.5 shaver.  The inferior recess was inspected, no loose bodies.  Attention was turned laterally where patient was noted to have a full-thickness and slightly retracted rotator cuff tear.  Given this a mini open 3 cm incision was made over the anterolateral aspect of the right shoulder soft tissue dissection was taken down the fascia were split in line with its fibers.  Next the subacromial space was entered, large amount of bursal tissue was removed.  She did have scraping of the undersurface of the acromion.  Next after the subacromial decompression the rotator cuff was identified with use of 4 strands and 1 anchor the rotator cuff tear was repaired back down.  There was additional tears appreciated after the repair.  After this was done, hemostasis was achieved.  Copious irrigation was used to wash the wound.  The fascia was closed with 0 Vicryl, subcutaneous tissues closed with 2-0 Vicryl, skin was closed with 3-0 nylon suture.  Xeroform, 4 x 4's, soft tissue dressing, and a shoulder abduction sling were placed on the affected upper extremity.  The patient was awoken by Anesthesia and brought to PACU in stable condition.    ______________________________  Donta Belle MD

## 2024-05-23 ENCOUNTER — OFFICE VISIT (OUTPATIENT)
Dept: ORTHOPEDICS | Facility: CLINIC | Age: 59
End: 2024-05-23
Payer: OTHER GOVERNMENT

## 2024-05-23 VITALS
BODY MASS INDEX: 29.31 KG/M2 | DIASTOLIC BLOOD PRESSURE: 93 MMHG | HEART RATE: 71 BPM | WEIGHT: 175.94 LBS | HEIGHT: 65 IN | SYSTOLIC BLOOD PRESSURE: 135 MMHG

## 2024-05-23 DIAGNOSIS — M75.121 COMPLETE TEAR OF RIGHT ROTATOR CUFF, UNSPECIFIED WHETHER TRAUMATIC: ICD-10-CM

## 2024-05-23 DIAGNOSIS — S43.431D TYPE 1 SUPERIOR LABRAL ANTERIOR-TO-POSTERIOR (SLAP) TEAR OF RIGHT SHOULDER, SUBSEQUENT ENCOUNTER: ICD-10-CM

## 2024-05-23 DIAGNOSIS — M75.41 SHOULDER IMPINGEMENT SYNDROME, RIGHT: Primary | ICD-10-CM

## 2024-05-23 PROCEDURE — 99024 POSTOP FOLLOW-UP VISIT: CPT | Mod: ,,,

## 2024-05-23 NOTE — PROGRESS NOTES
Subjective:    CC: Post-op Evaluation of the Right Shoulder (Post-op for right shoulder sx. Shoulder is doing good. Has a lot of pain mainly at night. Has stiffness in her arm. No swelling or drainage. No concerns with it.)       HPI:  Patient returns to clinic for post op visit. Status post right shoulder rotator cuff repair and labral debridement on 05/10/2024. Approximately 2 weeks out. The patients pain is well managed on ibuprofen; no narcotic use.  She states that her shoulder is overall doing well does have some stiffness and occasional pain at night.  The patient states no signs of infection. Patient presents today wearing an abduction sling. No new complaints.    ROS: Refer to HPI for pertinent ROS. All other 12 point systems negative.    Objective:    Vitals:    05/23/24 0902   BP: (!) 135/93   Pulse: 71        Physical Exam:  Right upper extremity compartments are soft and warm. There are no signs or symptoms of DVT or infection. Incision sites are well healed, clean, and dry.  Stitches removed in clinic today and Steri-Strips applied.  Appropriately tender to palpation about anterolateral shoulder aspect. Passive range of motion shows that the patient has 120 degrees of abduction and forward flexion; 90 active ROM. Neurovascularly intact distally.    Images:  Previous Images Reviewed and discussed with patient.    Assessment:  1. Shoulder impingement syndrome, right  - Ambulatory referral/consult to Physical/Occupational Therapy; Future    2. Complete tear of right rotator cuff, unspecified whether traumatic  - Ambulatory referral/consult to Physical/Occupational Therapy; Future    3. Type 1 superior labral anterior-to-posterior (SLAP) tear of right shoulder, subsequent encounter  - Ambulatory referral/consult to Physical/Occupational Therapy; Future       Plan:  Physical exam and intraoperative findings discussed with the patient. Wound care instructions given.  We will discontinue her sling.  She will  start formal physical therapy with focus on range of motion only at this time; hold off on strengthening. The Patient was instructed to take OTC pain medication as needed with appropriate precautions and to refrain from heavy lifting. I would like to see the patient back in 4 weeks to assess the patients progress.    Follow up: Follow up in about 4 weeks (around 6/20/2024).

## 2024-06-20 ENCOUNTER — OFFICE VISIT (OUTPATIENT)
Dept: ORTHOPEDICS | Facility: CLINIC | Age: 59
End: 2024-06-20
Payer: OTHER GOVERNMENT

## 2024-06-20 VITALS
DIASTOLIC BLOOD PRESSURE: 89 MMHG | WEIGHT: 175.94 LBS | BODY MASS INDEX: 29.31 KG/M2 | SYSTOLIC BLOOD PRESSURE: 136 MMHG | HEART RATE: 71 BPM | HEIGHT: 65 IN

## 2024-06-20 DIAGNOSIS — M75.121 COMPLETE TEAR OF RIGHT ROTATOR CUFF, UNSPECIFIED WHETHER TRAUMATIC: Primary | ICD-10-CM

## 2024-06-20 DIAGNOSIS — S43.431D TYPE 1 SUPERIOR LABRAL ANTERIOR-TO-POSTERIOR (SLAP) TEAR OF RIGHT SHOULDER, SUBSEQUENT ENCOUNTER: ICD-10-CM

## 2024-06-20 DIAGNOSIS — M75.41 SHOULDER IMPINGEMENT SYNDROME, RIGHT: ICD-10-CM

## 2024-06-20 NOTE — PROGRESS NOTES
Subjective:    CC: Follow-up of the Right Shoulder (SX 5/10/24- Reports some stiffness/ soreness. Stated PT has been going well. Denies any swelling. )       HPI:  Patient returns to clinic for post op visit. Status post right shoulder labral debridement and rotator cuff repair on 05/10/2024. Approximately 6 weeks out. The patients pain is no longer present.  Not currently taking any pain medication.  She is attending formal physical therapy with good progress.. The patient states no signs of infection. No new complaints.    ROS: Refer to HPI for pertinent ROS. All other 12 point systems negative.    Objective:    Vitals:    06/20/24 0932   BP: 136/89   Pulse: 71        Physical Exam:  Right upper extremity compartments are soft and warm. There are no signs or symptoms of DVT or infection. Incision sites are well healed, clean, and dry. Non tender to palpation about the shoulder. Passive range of motion shows that the patient has 180 degrees of abduction and forward flexion; 170 active ROM. Neurovascularly intact distally.    Images:  Previous Images Reviewed and discussed with patient.    Assessment:  1. Complete tear of right rotator cuff, unspecified whether traumatic    2. Type 1 superior labral anterior-to-posterior (SLAP) tear of right shoulder, subsequent encounter    3. Shoulder impingement syndrome, right       Plan:  Physical exam and intraoperative findings discussed with the patient.  Patient is doing very well overall.  She will continue physical therapy and start working on gradual strengthening. The Patient was instructed to take OTC pain medication as needed with appropriate precautions and to refrain from heavy lifting. I would like to see the patient back in 8 weeks to assess the patients progress.    Follow up: Follow up in about 8 weeks (around 8/15/2024).

## (undated) DEVICE — SUT 3-0 MONOCRYL PLUS PS-2

## (undated) DEVICE — QUATTRO SUTURE PASSER NEEDLE

## (undated) DEVICE — GLOVE SENSICARE PI GRN 8.5

## (undated) DEVICE — TUBE SET INFLOW/OUTFLOW

## (undated) DEVICE — BLADE SHAVER GREAT WHITE 3.5MM

## (undated) DEVICE — DRAPE SHOULDER BEACH CHAIR

## (undated) DEVICE — NDL ANES SPINAL 18X3.5ST 18G

## (undated) DEVICE — Device

## (undated) DEVICE — COVER MAYO STAND REINFRCD 30

## (undated) DEVICE — PAD ABDOMINAL STERILE 8X10IN

## (undated) DEVICE — SUT VICRYL PLUS 0 CT1 18IN

## (undated) DEVICE — STRIP MEDI WND CLSR 1/2X4IN

## (undated) DEVICE — TUBE SUCTION MEDI-VAC STERILE

## (undated) DEVICE — DRAPE U STD FILM LG 60X84IN

## (undated) DEVICE — APPLICATOR CHLORAPREP ORN 26ML

## (undated) DEVICE — GLOVE SENSICARE PI GRN 6.5

## (undated) DEVICE — DRAPE STERI U-SHAPED 47X51IN

## (undated) DEVICE — GLOVE SIGNATURE ESSNTL LTX 6.5

## (undated) DEVICE — GOWN POLY REINF X-LONG 2XL

## (undated) DEVICE — KIT SURGICAL TURNOVER

## (undated) DEVICE — GLOVE SENSICARE PI MICRO 8

## (undated) DEVICE — BLADE SURG CARBON STEEL SZ11

## (undated) DEVICE — SYR 50CC LL

## (undated) DEVICE — SUPPORT SLING SHOT II MEDIUM

## (undated) DEVICE — TAPE BROADBAND TWO PACK 1.5MM

## (undated) DEVICE — BENZOIN TINCTURE CAPSULET

## (undated) DEVICE — SUT VICRYL PLUS 2-0 CT1 18

## (undated) DEVICE — ELECTRODE PATIENT RETURN DISP

## (undated) DEVICE — SOL NACL IRR 3000ML

## (undated) DEVICE — STOCKINET 4INX48

## (undated) DEVICE — TAPE ADH MEDIPORE 4 X 10YDS